# Patient Record
Sex: FEMALE | Race: WHITE | Employment: FULL TIME | URBAN - METROPOLITAN AREA
[De-identification: names, ages, dates, MRNs, and addresses within clinical notes are randomized per-mention and may not be internally consistent; named-entity substitution may affect disease eponyms.]

---

## 2017-01-16 ENCOUNTER — OFFICE VISIT (OUTPATIENT)
Dept: SLEEP MEDICINE | Age: 57
End: 2017-01-16

## 2017-01-16 VITALS
WEIGHT: 293 LBS | SYSTOLIC BLOOD PRESSURE: 144 MMHG | DIASTOLIC BLOOD PRESSURE: 79 MMHG | OXYGEN SATURATION: 94 % | HEART RATE: 67 BPM | HEIGHT: 62 IN | BODY MASS INDEX: 53.92 KG/M2 | TEMPERATURE: 96.9 F

## 2017-01-16 DIAGNOSIS — G47.33 OSA (OBSTRUCTIVE SLEEP APNEA): Primary | ICD-10-CM

## 2017-01-16 DIAGNOSIS — E66.01 OBESITY, MORBID, BMI 50 OR HIGHER (HCC): ICD-10-CM

## 2017-01-16 NOTE — MR AVS SNAPSHOT
Visit Information Date & Time Provider Department Dept. Phone Encounter #  
 1/16/2017  3:00 PM Arben Roman, Kristan Zamarripa [id number] Follow-up Instructions Return if symptoms worsen or fail to improve. Follow-up and Disposition History Upcoming Health Maintenance Date Due Hepatitis C Screening 1960 DTaP/Tdap/Td series (1 - Tdap) 7/8/1979 PAP AKA CERVICAL CYTOLOGY 7/8/1979 FOBT Q 1 YEAR AGE 50-75 7/8/2008 INFLUENZA AGE 9 TO ADULT 8/1/2016 BREAST CANCER SCRN MAMMOGRAM 9/3/2017 Allergies as of 1/16/2017  Review Complete On: 1/16/2017 By: Arben Roman MD  
  
 Severity Noted Reaction Type Reactions Erythromycin  06/23/2011    Hives Current Immunizations  Never Reviewed Name Date Pneumococcal Polysaccharide (PPSV-23) 3/16/2016 10:32 AM  
  
 Not reviewed this visit You Were Diagnosed With   
  
 Codes Comments NANETTE (obstructive sleep apnea)    -  Primary ICD-10-CM: G47.33 
ICD-9-CM: 327.23 Obesity, morbid, BMI 50 or higher (HCC)     ICD-10-CM: E66.01 
ICD-9-CM: 278.01 Vitals BP Pulse Temp Height(growth percentile) Weight(growth percentile) SpO2  
 144/79 67 96.9 °F (36.1 °C) 5' 2\" (1.575 m) 311 lb (141.1 kg) 94% BMI OB Status Smoking Status 56.88 kg/m2 Polycystic Ovarian Syndrome Never Smoker Vitals History BMI and BSA Data Body Mass Index Body Surface Area  
 56.88 kg/m 2 2.48 m 2 [phone] [facility] [place], [address] AT [facility] [phone] Your Updated Medication List  
  
   
This list is accurate as of: 1/16/17  3:02 PM.  Always use your most recent med list.  
  
  
  
  
 albuterol 90 mcg/actuation inhaler Commonly known as:  PROVENTIL HFA, VENTOLIN HFA, PROAIR HFA  
2 puff QID.  AMLODIPINE PO  
 Take 5 mg by mouth daily (with dinner). atorvastatin 20 mg tablet Commonly known as:  LIPITOR Take 20 mg by mouth daily (with dinner). CeleBREX 200 mg capsule Generic drug:  celecoxib Take  by mouth two (2) times a day. doxycycline 100 mg capsule Commonly known as:  Sholaren Hashimoto Take 100 mg by mouth daily. HYDROcodone-acetaminophen  mg tablet Commonly known as:  Harshal Dolphin Take 1 Tab by mouth every six (6) hours as needed. Max Daily Amount: 4 Tabs. ibuprofen 600 mg tablet Commonly known as:  MOTRIN Take 1 Tab by mouth every six (6) hours as needed for Pain. LEVOTHYROXINE PO Take 137 mcg by mouth. LOSARTAN PO Take 50 mg by mouth daily (with dinner). METFORMIN PO Take 2,000 mg by mouth. ondansetron 4 mg disintegrating tablet Commonly known as:  ZOFRAN ODT Take 1 Tab by mouth every eight (8) hours as needed for Nausea. PEPCID 40 mg tablet Generic drug:  famotidine Take 40 mg by mouth daily. spironolactone 100 mg tablet Commonly known as:  ALDACTONE Take 100 mg by mouth daily. We Performed the Following AMB SUPPLY ORDER [[id number] Custom] Comments:  
 Wireless modem enrollment with privileges to change therapy remotely - indefinite. PAP Mask -patient preference, tubing, filters as needed (all sleep supplies) Length of Need = 99 months Tim Hussein M.D.  (electronically signed) Diplomate in Sleep Medicine, ABIM Follow-up Instructions Return if symptoms worsen or fail to improve. Patient Instructions [address] [facility], [place] [address] Tel.  [phone] Fax. 100 [facility] [address] Tel.  [phone] Fax. [phone] [address] [place] [address] Tel.  [phone] Fax. [phone] Learning About CPAP for Sleep Apnea What is CPAP? CPAP is a small machine that you use at home every night while you sleep. It increases air pressure in your throat to keep your airway open. When you have sleep apnea, this can help you sleep better so you feel much better. CPAP stands for \"continuous positive airway pressure. \" The CPAP machine will have one of the following: · A mask that covers your nose and mouth · Prongs that fit into your nose · A mask that covers your nose only, the most common type. This type is called NCPAP. The N stands for \"nasal.\" Why is it done? CPAP is usually the best treatment for obstructive sleep apnea. It is the first treatment choice and the most widely used. Your doctor may suggest CPAP if you have: · Moderate to severe sleep apnea. · Sleep apnea and coronary artery disease (CAD) or heart failure. How does it help? · CPAP can help you have more normal sleep, so you feel less sleepy and more alert during the daytime. · CPAP may help keep heart failure or other heart problems from getting worse. · NCPAP may help lower your blood pressure. · If you use CPAP, your bed partner may also sleep better because you are not snoring or restless. What are the side effects? Some people who use CPAP have: · A dry or stuffy nose and a sore throat. · Irritated skin on the face. · Sore eyes. · Bloating. If you have any of these problems, work with your doctor to fix them. Here are some things you can try: · Be sure the mask or nasal prongs fit well. · See if your doctor can adjust the pressure of your CPAP. · If your nose is dry, try a humidifier. · If your nose is runny or stuffy, try decongestant medicine or a steroid nasal spray. If these things do not help, you might try a different type of machine. Some machines have air pressure that adjusts on its own.  Others have air pressures that are different when you breathe in than when you breathe out. This may reduce discomfort caused by too much pressure in your nose. Where can you learn more? Go to ReCoTech.be Enter F570 in the search box to learn more about \"Learning About CPAP for Sleep Apnea. \"  
© 9321-4829 Healthwise, Incorporated. Care instructions adapted under license by Tahira Eubanks (which disclaims liability or warranty for this information). This care instruction is for use with your licensed healthcare professional. If you have questions about a medical condition or this instruction, always ask your healthcare professional. [address] any warranty or liability for your use of this information. Content Version: 2.0.52262; Last Revised: January 11, 2010 PROPER SLEEP HYGIENE What to avoid · Do not have drinks with caffeine, such as coffee or black tea, for 8 hours before bed. · Do not smoke or use other types of tobacco near bedtime. Nicotine is a stimulant and can keep you awake. · Avoid drinking alcohol late in the evening, because it can cause you to wake in the middle of the night. · Do not eat a big meal close to bedtime. If you are hungry, eat a light snack. · Do not drink a lot of water close to bedtime, because the need to urinate may wake you up during the night. · Do not read or watch TV in bed. Use the bed only for sleeping and sexual activity. What to try · Go to bed at the same time every night, and wake up at the same time every morning. Do not take naps during the day. · Keep your bedroom quiet, dark, and cool. · Get regular exercise, but not within 3 to 4 hours of your bedtime. Jeromy Robison · Sleep on a comfortable pillow and mattress. · If watching the clock makes you anxious, turn it facing away from you so you cannot see the time. · If you worry when you lie down, start a worry book. Well before bedtime, write down your worries, and then set the book and your concerns aside. · Try meditation or other relaxation techniques before you go to bed. · If you cannot fall asleep, get up and go to another room until you feel sleepy. Do something relaxing. Repeat your bedtime routine before you go to bed again. · Make your house quiet and calm about an hour before bedtime. Turn down the lights, turn off the TV, log off the computer, and turn down the volume on music. This can help you relax after a busy day. Drowsy Driving: The [employer] cites drowsiness as a causing factor in more than 990,634 police reported crashes annually, resulting in 76,000 injuries and 1,500 deaths. Other surveys suggest 55% of people polled have driven while drowsy in the past year, 23% had fallen asleep but not crashed, 3% crashed, and 2% had and accident due to drowsy driving. Who is at risk? Young Drivers: One study of drowsy driving accidents states that 55% of the drivers were under 25 years. Of those, 75% were male. Shift Workers and Travelers: People who work overnight or travel across time zones frequently are at higher risk of experiencing Circadian Rhythm Disorders. They are trying to work and function when their body is programed to sleep. Sleep Deprived: Lack of sleep has a serious impact on your ability to pay attention or focus on a task. Consistently getting less than the average of 8 hours your body needs creates partial or cumulative sleep deprivation. Untreated Sleep Disorders: Sleep Apnea, Narcolepsy, R.L.S., and other sleep disorders (untreated) prevent a person from getting enough restful sleep. This leads to excessive daytime sleepiness and increases the risk for drowsy driving accidents by up to 7 times. Medications / Alcohol: Even over the counter medications can cause drowsiness. Medications that impair a drivers attention should have a warning label.  Alcohol naturally makes you sleepy and on its own can cause accidents. Combined with excessive drowsiness its effects are amplified. Signs of Drowsy Driving: * You don't remember driving the last few miles * You may drift out of your henry * You are unable to focus and your thoughts wander * You may yawn more often than normal 
 * You have difficulty keeping your eyes open / nodding off * Missing traffic signs, speeding, or tailgating Prevention-  
Good sleep hygiene, lifestyle and behavioral choices have the most impact on drowsy driving. There is no substitute for sleep and the average person requires 8 hours nightly. If you find yourself driving drowsy, stop and sleep. Consider the sleep hygiene tips provided during your visit as well. Medication Refill Policy: Refills for all medications require 1 week advance notice. Please have your pharmacy fax a refill request. We are unable to fax, or call in \"controled substance\" medications and you will need to pick these prescriptions up from our office. Thinktwice Activation Thank you for requesting access to Thinktwice. Please follow the instructions below to securely access and download your online medical record. Thinktwice allows you to send messages to your doctor, view your test results, renew your prescriptions, schedule appointments, and more. How Do I Sign Up? 1. In your internet browser, go to [email or web address]. 2. Click on the First Time User? Click Here link in the Sign In box. You will see the New Member Sign Up page. 3. Enter your Thinktwice Access Code exactly as it appears below. You will not need to use this code after youve completed the sign-up process. If you do not sign up before the expiration date, you must request a new code. Thinktwice Access Code: [email or web address] Expires: 2017  2:49 PM (This is the date your Thinktwice access code will ) 4.  Enter the last four digits of your Social Security Number (xxxx) and Date of Birth (mm/dd/yyyy) as indicated and click Submit. You will be taken to the next sign-up page. 5. Create a Mobile2Me ID. This will be your Mobile2Me login ID and cannot be changed, so think of one that is secure and easy to remember. 6. Create a Mobile2Me password. You can change your password at any time. 7. Enter your Password Reset Question and Answer. This can be used at a later time if you forget your password. 8. Enter your e-mail address. You will receive e-mail notification when new information is available in [address] 9. Click Sign Up. You can now view and download portions of your medical record. 10. Click the Download Summary menu link to download a portable copy of your medical information. Additional Information If you have questions, please call [phone]. Remember, Mobile2Me is NOT to be used for urgent needs. For medical emergencies, dial 911. Starting a Weight Loss Plan: After Your Visit Your Care Instructions If you are thinking about losing weight, it can be hard to know where to start. Your doctor can help you set up a weight loss plan that best meets your needs. You may want to take a class on nutrition or exercise, or join a weight loss support group. If you have questions about how to make changes to your eating or exercise habits, ask your doctor about seeing a registered dietitian or an exercise specialist. 
It can be a big challenge to lose weight. But you do not have to make huge changes at once. Make small changes, and stick with them. When those changes become habit, add a few more changes. If you do not think you are ready to make changes right now, try to pick a date in the future. Make an appointment to see your doctor to discuss whether the time is right for you to start a plan. Follow-up care is a key part of your treatment and safety.  Be sure to make and go to all appointments, and call your doctor if you are having problems. It's also a good idea to know your test results and keep a list of the medicines you take. How can you care for yourself at home? · Set realistic goals. Many people expect to lose much more weight than is likely. A weight loss of 5% to 10% of your body weight may be enough to improve your health. · Get family and friends involved to provide support. Talk to them about why you are trying to lose weight, and ask them to help. They can help by participating in exercise and having meals with you, even if they may be eating something different. · Find what works best for you. If you do not have time or do not like to cook, a program that offers meal replacement bars or shakes may be better for you. Or if you like to prepare meals, finding a plan that includes daily menus and recipes may be best. 
· Ask your doctor about other health professionals who can help you achieve your weight loss goals. ¨ A dietitian can help you make healthy changes in your diet. ¨ An exercise specialist or  can help you develop a safe and effective exercise program. 
¨ A counselor or psychiatrist can help you cope with issues such as depression, anxiety, or family problems that can make it hard to focus on weight loss. · Consider joining a support group for people who are trying to lose weight. Your doctor can suggest groups in your area. Where can you learn more? Go to [employer].be Enter K519 in the search box to learn more about \"Starting a Weight Loss Plan: After Your Visit. \"  
© 5719-5481 Healthwise, Incorporated. Care instructions adapted under license by Nieves Delgadillo (which disclaims liability or warranty for this information).  This care instruction is for use with your licensed healthcare professional. If you have questions about a medical condition or this instruction, always ask your healthcare professional. [employer] disclaims any warranty or liability for your use of this information. Content Version: 3.3.43441; Last Revised: September 1, 2009 Introducing [facility]! Johnathan Shipman introduces [employer] patient portal. Now you can access parts of your medical record, email your doctor's office, and request medication refills online. 1. In your internet browser, go to [email or web address][email or web address] 2. Click on the First Time User? Click Here link in the Sign In box. You will see the New Member Sign Up page. 3. Enter your [employer] Access Code exactly as it appears below. You will not need to use this code after youve completed the sign-up process. If you do not sign up before the expiration date, you must request a new code. · [employer] Access Code: [email or web address] Expires: 4/16/2017  2:49 PM 
 
4. Enter the last four digits of your Social Security Number (xxxx) and Date of Birth (mm/dd/yyyy) as indicated and click Submit. You will be taken to the next sign-up page. 5. Create a [employer] ID. This will be your [employer] login ID and cannot be changed, so think of one that is secure and easy to remember. 6. Create a [employer] password. You can change your password at any time. 7. Enter your Password Reset Question and Answer. This can be used at a later time if you forget your password. 8. Enter your e-mail address. You will receive e-mail notification when new information is available in [address] 9. Click Sign Up. You can now view and download portions of your medical record. 10. Click the Download Summary menu link to download a portable copy of your medical information. If you have questions, please visit the Frequently Asked Questions section of the [employer] website. Remember, [employer] is NOT to be used for urgent needs. For medical emergencies, dial 911. Now available from your iPhone and Android! Please provide this summary of care documentation to your next provider. Your primary care clinician is listed as Na Lazo. If you have any questions after today's visit, please call [phone].

## 2017-01-16 NOTE — PATIENT INSTRUCTIONS
[address] [facility] [place]. [place], [address]  Tel.  [phone]  Fax. 100 [facility] 60  [place], [address] [facility]  Tel.  [phone]  Fax. [phone] [address]  Tel.  [phone]  Fax. [phone]     Learning About CPAP for Sleep Apnea  What is CPAP? CPAP is a small machine that you use at home every night while you sleep. It increases air pressure in your throat to keep your airway open. When you have sleep apnea, this can help you sleep better so you feel much better. CPAP stands for \"continuous positive airway pressure. \"  The CPAP machine will have one of the following:  · A mask that covers your nose and mouth  · Prongs that fit into your nose  · A mask that covers your nose only, the most common type. This type is called NCPAP. The N stands for \"nasal.\"  Why is it done? CPAP is usually the best treatment for obstructive sleep apnea. It is the first treatment choice and the most widely used. Your doctor may suggest CPAP if you have:  · Moderate to severe sleep apnea. · Sleep apnea and coronary artery disease (CAD) or heart failure. How does it help? · CPAP can help you have more normal sleep, so you feel less sleepy and more alert during the daytime. · CPAP may help keep heart failure or other heart problems from getting worse. · NCPAP may help lower your blood pressure. · If you use CPAP, your bed partner may also sleep better because you are not snoring or restless. What are the side effects? Some people who use CPAP have:  · A dry or stuffy nose and a sore throat. · Irritated skin on the face. · Sore eyes. · Bloating. If you have any of these problems, work with your doctor to fix them. Here are some things you can try:  · Be sure the mask or nasal prongs fit well. · See if your doctor can adjust the pressure of your CPAP. · If your nose is dry, try a humidifier.   · If your nose is runny or stuffy, try decongestant medicine or a steroid nasal spray. If these things do not help, you might try a different type of machine. Some machines have air pressure that adjusts on its own. Others have air pressures that are different when you breathe in than when you breathe out. This may reduce discomfort caused by too much pressure in your nose. Where can you learn more? Go to OpenVPN.be  Enter Maryan Santos in the search box to learn more about \"Learning About CPAP for Sleep Apnea. \"   © 2659-5877 Healthwise, Incorporated. Care instructions adapted under license by Wilbur Joyce (which disclaims liability or warranty for this information). This care instruction is for use with your licensed healthcare professional. If you have questions about a medical condition or this instruction, always ask your healthcare professional. [address] any warranty or liability for your use of this information. Content Version: 1.9.16648; Last Revised: January 11, 2010  PROPER SLEEP HYGIENE    What to avoid  · Do not have drinks with caffeine, such as coffee or black tea, for 8 hours before bed. · Do not smoke or use other types of tobacco near bedtime. Nicotine is a stimulant and can keep you awake. · Avoid drinking alcohol late in the evening, because it can cause you to wake in the middle of the night. · Do not eat a big meal close to bedtime. If you are hungry, eat a light snack. · Do not drink a lot of water close to bedtime, because the need to urinate may wake you up during the night. · Do not read or watch TV in bed. Use the bed only for sleeping and sexual activity. What to try  · Go to bed at the same time every night, and wake up at the same time every morning. Do not take naps during the day. · Keep your bedroom quiet, dark, and cool. · Get regular exercise, but not within 3 to 4 hours of your bedtime. .  · Sleep on a comfortable pillow and mattress.   · If watching the clock makes you anxious, turn it facing away from you so you cannot see the time. · If you worry when you lie down, start a worry book. Well before bedtime, write down your worries, and then set the book and your concerns aside. · Try meditation or other relaxation techniques before you go to bed. · If you cannot fall asleep, get up and go to another room until you feel sleepy. Do something relaxing. Repeat your bedtime routine before you go to bed again. · Make your house quiet and calm about an hour before bedtime. Turn down the lights, turn off the TV, log off the computer, and turn down the volume on music. This can help you relax after a busy day. Drowsy Driving: The [employer] cites drowsiness as a causing factor in more than 374,508 police reported crashes annually, resulting in 76,000 injuries and 1,500 deaths. Other surveys suggest 55% of people polled have driven while drowsy in the past year, 23% had fallen asleep but not crashed, 3% crashed, and 2% had and accident due to drowsy driving. Who is at risk? Young Drivers: One study of drowsy driving accidents states that 55% of the drivers were under 25 years. Of those, 75% were male. Shift Workers and Travelers: People who work overnight or travel across time zones frequently are at higher risk of experiencing Circadian Rhythm Disorders. They are trying to work and function when their body is programed to sleep. Sleep Deprived: Lack of sleep has a serious impact on your ability to pay attention or focus on a task. Consistently getting less than the average of 8 hours your body needs creates partial or cumulative sleep deprivation. Untreated Sleep Disorders: Sleep Apnea, Narcolepsy, R.L.S., and other sleep disorders (untreated) prevent a person from getting enough restful sleep. This leads to excessive daytime sleepiness and increases the risk for drowsy driving accidents by up to 7 times.   Medications / Alcohol: Even over the counter medications can cause drowsiness. Medications that impair a drivers attention should have a warning label. Alcohol naturally makes you sleepy and on its own can cause accidents. Combined with excessive drowsiness its effects are amplified. Signs of Drowsy Driving:   * You don't remember driving the last few miles   * You may drift out of your henry   * You are unable to focus and your thoughts wander   * You may yawn more often than normal   * You have difficulty keeping your eyes open / nodding off   * Missing traffic signs, speeding, or tailgating  Prevention-   Good sleep hygiene, lifestyle and behavioral choices have the most impact on drowsy driving. There is no substitute for sleep and the average person requires 8 hours nightly. If you find yourself driving drowsy, stop and sleep. Consider the sleep hygiene tips provided during your visit as well. Medication Refill Policy: Refills for all medications require 1 week advance notice. Please have your pharmacy fax a refill request. We are unable to fax, or call in \"controled substance\" medications and you will need to pick these prescriptions up from our office. AQUA PURE Activation    Thank you for requesting access to AQUA PURE. Please follow the instructions below to securely access and download your online medical record. AQUA PURE allows you to send messages to your doctor, view your test results, renew your prescriptions, schedule appointments, and more. How Do I Sign Up? 1. In your internet browser, go to [email or web address]. 2. Click on the First Time User? Click Here link in the Sign In box. You will see the New Member Sign Up page. 3. Enter your AQUA PURE Access Code exactly as it appears below. You will not need to use this code after youve completed the sign-up process. If you do not sign up before the expiration date, you must request a new code.     AQUA PURE Access Code: Samuel Ovalle  Expires: 2017  2:49 PM (This is the date your [email or web address] access code will )    4. Enter the last four digits of your Social Security Number (xxxx) and Date of Birth (mm/dd/yyyy) as indicated and click Submit. You will be taken to the next sign-up page. 5. Create a [email or web address] ID. This will be your [email or web address] login ID and cannot be changed, so think of one that is secure and easy to remember. 6. Create a [email or web address] password. You can change your password at any time. 7. Enter your Password Reset Question and Answer. This can be used at a later time if you forget your password. 8. Enter your e-mail address. You will receive e-mail notification when new information is available in [address] 9. Click Sign Up. You can now view and download portions of your medical record. 10. Click the Download Summary menu link to download a portable copy of your medical information. Additional Information    If you have questions, please call [phone]. Remember, [email or web address] is NOT to be used for urgent needs. For medical emergencies, dial 911. Starting a Weight Loss Plan: After Your Visit  Your Care Instructions  If you are thinking about losing weight, it can be hard to know where to start. Your doctor can help you set up a weight loss plan that best meets your needs. You may want to take a class on nutrition or exercise, or join a weight loss support group. If you have questions about how to make changes to your eating or exercise habits, ask your doctor about seeing a registered dietitian or an exercise specialist.  It can be a big challenge to lose weight. But you do not have to make huge changes at once. Make small changes, and stick with them. When those changes become habit, add a few more changes. If you do not think you are ready to make changes right now, try to pick a date in the future.  Make an appointment to see your doctor to discuss whether the time is right for you to start a plan.  Follow-up care is a key part of your treatment and safety. Be sure to make and go to all appointments, and call your doctor if you are having problems. It's also a good idea to know your test results and keep a list of the medicines you take. How can you care for yourself at home? · Set realistic goals. Many people expect to lose much more weight than is likely. A weight loss of 5% to 10% of your body weight may be enough to improve your health. · Get family and friends involved to provide support. Talk to them about why you are trying to lose weight, and ask them to help. They can help by participating in exercise and having meals with you, even if they may be eating something different. · Find what works best for you. If you do not have time or do not like to cook, a program that offers meal replacement bars or shakes may be better for you. Or if you like to prepare meals, finding a plan that includes daily menus and recipes may be best.  · Ask your doctor about other health professionals who can help you achieve your weight loss goals. ¨ A dietitian can help you make healthy changes in your diet. ¨ An exercise specialist or  can help you develop a safe and effective exercise program.  ¨ A counselor or psychiatrist can help you cope with issues such as depression, anxiety, or family problems that can make it hard to focus on weight loss. · Consider joining a support group for people who are trying to lose weight. Your doctor can suggest groups in your area. Where can you learn more? Go to ContentForest.be  Enter U357 in the search box to learn more about \"Starting a Weight Loss Plan: After Your Visit. \"   © 6059-8023 Healthwise, Incorporated. Care instructions adapted under license by [employer] (which disclaims liability or warranty for this information).  This care instruction is for use with your licensed healthcare professional. If you have questions about a medical condition or this instruction, always ask your healthcare professional. [place] [address] any warranty or liability for your use of this information.   Content Version: 7.6.22022; Last Revised: September 1, 2009

## 2017-01-16 NOTE — PROGRESS NOTES
201 [facility]., [place]. [place], [address]  Tel.  [phone]  Fax. 100 [facility] 60  [place], [address] [facility]  Tel.  [phone]  Fax. [phone] [address]   Tel.  [phone]  Fax. [phone]     S>Renu Albert is a 60 y.o. female seen for a positive airway pressure follow-up. She reports no problems using the device. The following problems are identified:    Drowsiness no Problems exhaling no   Snoring no Forget to put on no   Mask Comfortable yes Can't fall asleep no   Dry Mouth no Mask falls off no   Air Leaking no Frequent awakenings no         She admits that her sleep has improved. Allergies   Allergen Reactions    Erythromycin Hives       She has a current medication list which includes the following prescription(s): ibuprofen, atorvastatin, famotidine, spironolactone, doxycycline, amlodipine besylate, losartan potassium, levothyroxine sodium, hydrocodone-acetaminophen, ondansetron, celecoxib, albuterol, and metformin hcl. .      She  has a past medical history of Arthritis; Chronic pain; GERD (gastroesophageal reflux disease); Hypertension; Hypothyroid; Polycystic disease, ovaries; and Sleep apnea. [place] Sleepiness Score: 1   and Modified F.O.S.Q. Score Total / 2: 18   which reflect improved sleep quality over therapy time. O>    Visit Vitals    /79    Pulse 67    Temp 96.9 °F (36.1 °C)    Ht 5' 2\" (1.575 m)    Wt 311 lb (141.1 kg)    SpO2 94%    BMI 56.88 kg/m2           General:   Alert, oriented, not in distress   Neck:   No JVD    Chest/Lungs:  symetrical lung expansion , no accessory muscle use    Extremities:  no obvious rashes , negative edema    Neuro:  No focal deficits ; No obvious tremor    Psych:  Normal affect ,  Normal countenance ;           A>    ICD-10-CM ICD-9-CM    1. NANETTE (obstructive sleep apnea) G47.33 327.23 AMB SUPPLY ORDER   2.  Obesity, morbid, BMI 50 or higher ([facility]) E66.01 278.01      AHI = 77 (2015). On CPAP :  10-14 cmH2O. Compliant:      yes    Therapeutic Response:  Positive    P>      * Follow-up Disposition:  Return if symptoms worsen or fail to improve. * She was asked to contact our office for any problems regarding PAP therapy. * Counseling was provided regarding the importance of regular PAP use and on proper sleep hygiene and safe driving. * Re-enforced proper and regular cleaning for the device. Discussed the patient's above normal BMI with her. I have recommended the following interventions: dietary management education, guidance, and counseling . The BMI follow up plan is as follows: BMI is out of normal parameters and plan is as follows: I have counseled this patient on diet and exercise regimens    Thank you for allowing us to participate in your patient's medical care. Larry Blancas M.D.   Diplomate in Sleep Medicine, [facility]

## 2017-01-17 ENCOUNTER — DOCUMENTATION ONLY (OUTPATIENT)
Dept: SLEEP MEDICINE | Age: 57
End: 2017-01-17

## 2017-07-27 ENCOUNTER — HOSPITAL ENCOUNTER (OUTPATIENT)
Dept: MAMMOGRAPHY | Age: 57
Discharge: HOME | End: 2017-07-27
Attending: SPECIALIST
Payer: COMMERCIAL

## 2017-07-27 DIAGNOSIS — Z12.31 VISIT FOR SCREENING MAMMOGRAM: ICD-10-CM

## 2017-07-27 PROCEDURE — 77067 SCR MAMMO BI INCL CAD: CPT

## 2018-02-24 ENCOUNTER — OFFICE VISIT (OUTPATIENT)
Dept: PRIMARY CARE CLINIC | Age: 58
End: 2018-02-24

## 2018-02-24 VITALS
OXYGEN SATURATION: 95 % | WEIGHT: 293 LBS | TEMPERATURE: 98 F | RESPIRATION RATE: 22 BRPM | HEIGHT: 62 IN | BODY MASS INDEX: 53.92 KG/M2 | SYSTOLIC BLOOD PRESSURE: 133 MMHG | HEART RATE: 59 BPM | DIASTOLIC BLOOD PRESSURE: 69 MMHG

## 2018-02-24 DIAGNOSIS — J01.00 ACUTE MAXILLARY SINUSITIS, RECURRENCE NOT SPECIFIED: Primary | ICD-10-CM

## 2018-02-24 RX ORDER — AMOXICILLIN AND CLAVULANATE POTASSIUM 875; 125 MG/1; MG/1
1 TABLET, FILM COATED ORAL EVERY 12 HOURS
Qty: 20 TAB | Refills: 0 | Status: SHIPPED | OUTPATIENT
Start: 2018-02-24 | End: 2018-03-06

## 2018-02-24 NOTE — PATIENT INSTRUCTIONS
Sinusitis: Care Instructions  Your Care Instructions    Sinusitis is an infection of the lining of the sinus cavities in your head. Sinusitis often follows a cold. It causes pain and pressure in your head and face. In most cases, sinusitis gets better on its own in 1 to 2 weeks. But some mild symptoms may last for several weeks. Sometimes antibiotics are needed. Follow-up care is a key part of your treatment and safety. Be sure to make and go to all appointments, and call your doctor if you are having problems. It's also a good idea to know your test results and keep a list of the medicines you take. How can you care for yourself at home? · Take an over-the-counter pain medicine, such as acetaminophen (Tylenol), ibuprofen (Advil, Motrin), or naproxen (Aleve). Read and follow all instructions on the label. · If the doctor prescribed antibiotics, take them as directed. Do not stop taking them just because you feel better. You need to take the full course of antibiotics. · Be careful when taking over-the-counter cold or flu medicines and Tylenol at the same time. Many of these medicines have acetaminophen, which is Tylenol. Read the labels to make sure that you are not taking more than the recommended dose. Too much acetaminophen (Tylenol) can be harmful. · Breathe warm, moist air from a steamy shower, a hot bath, or a sink filled with hot water. Avoid cold, dry air. Using a humidifier in your home may help. Follow the directions for cleaning the machine. · Use saline (saltwater) nasal washes to help keep your nasal passages open and wash out mucus and bacteria. You can buy saline nose drops at a grocery store or drugstore. Or you can make your own at home by adding 1 teaspoon of salt and 1 teaspoon of baking soda to 2 cups of distilled water. If you make your own, fill a bulb syringe with the solution, insert the tip into your nostril, and squeeze gently. Chantelle Rivera your nose.   · Put a hot, wet towel or a warm gel pack on your face 3 or 4 times a day for 5 to 10 minutes each time. · Try a decongestant nasal spray like oxymetazoline (Afrin). Do not use it for more than 3 days in a row. Using it for more than 3 days can make your congestion worse. When should you call for help? Call your doctor now or seek immediate medical care if:  ? · You have new or worse swelling or redness in your face or around your eyes. ? · You have a new or higher fever. ? Watch closely for changes in your health, and be sure to contact your doctor if:  ? · You have new or worse facial pain. ? · The mucus from your nose becomes thicker (like pus) or has new blood in it. ? · You are not getting better as expected. Where can you learn more? Go to http://carey-robert.info/. Enter W743 in the search box to learn more about \"Sinusitis: Care Instructions. \"  Current as of: May 12, 2017  Content Version: 11.4  © 8298-9635 Healthwise, Incorporated. Care instructions adapted under license by [employer] (which disclaims liability or warranty for this information). If you have questions about a medical condition or this instruction, always ask your healthcare professional. [address] any warranty or liability for your use of this information.

## 2018-02-24 NOTE — PROGRESS NOTES
Subjective:   Kaylie Waller is a 59 y.o. female who complains of congestion, sore throat, nasal blockage and dry cough for 7 days, gradually worsening since that time. She denies a history of shortness of breath and wheezing. Evaluation to date: none. Treatment to date: OTC products. Patient does not smoke cigarettes. Relevant PMH: No pertinent additional PMH. Patient Active Problem List   Diagnosis Code    NANETTE (obstructive sleep apnea) G47.33     Patient Active Problem List    Diagnosis Date Noted    NANETTE (obstructive sleep apnea) 05/11/2015     Current Outpatient Prescriptions   Medication Sig Dispense Refill    SERTRALINE HCL (SERTRALINE PO) Take  by mouth. Indications: per pt-25mg or 50mg      ibuprofen (MOTRIN) 600 mg tablet Take 1 Tab by mouth every six (6) hours as needed for Pain. 30 Tab 0    atorvastatin (LIPITOR) 20 mg tablet Take 20 mg by mouth daily (with dinner).  famotidine (PEPCID) 40 mg tablet Take 40 mg by mouth daily.  spironolactone (ALDACTONE) 100 mg tablet Take 100 mg by mouth daily.  doxycycline (MONODOX) 100 mg capsule Take 100 mg by mouth daily as needed.  LOSARTAN PO Take 100 mg by mouth daily (with dinner).  LEVOTHYROXINE SODIUM (LEVOTHYROXINE PO) Take 137 mcg by mouth.        Allergies   Allergen Reactions    Erythromycin Hives     Past Medical History:   Diagnosis Date    Arthritis     OSTEOARTHRITIS IN KNEES AND RT ANKLE    Chronic pain     ARTHRITIS    GERD (gastroesophageal reflux disease)     Hypertension     Hypothyroid     Polycystic disease, ovaries     Sleep apnea     USES CPAP     Past Surgical History:   Procedure Laterality Date    FEMUR/KNEE SURG UNLISTED      right femur fx    HX BREAST BIOPSY Left     yrs ago    HX DILATION AND CURETTAGE  2/2016    HX ORTHOPAEDIC      carpal tunnel repair bilateral    HX TONSILLECTOMY       Family History   Problem Relation Age of Onset    Glaucoma Mother     Thyroid Disease Mother  Diabetes Mother      WITH NEUROPATHY    Hypertension Mother     Arrhythmia Mother      AFIB    Other Father      BPH    Muscular dystrophy Sister     Anesth Problems Neg Hx      Social History   Substance Use Topics    Smoking status: Never Smoker    Smokeless tobacco: Never Used    Alcohol use No        Review of Systems  Pertinent items are noted in HPI. Objective:     Visit Vitals    /69 (BP 1 Location: Left arm, BP Patient Position: Sitting)    Pulse (!) 59    Temp 98 °F (36.7 °C) (Oral)    Resp 22    Ht 5' 2\" (1.575 m)    Wt 309 lb (140.2 kg)    SpO2 95%    BMI 56.52 kg/m2     General:  alert, cooperative, no distress   Eyes: negative   Ears: abnormal TM AD - erythematous, bulging, abnormal TM AS - erythematous, bulging   Sinuses: tenderness over bilateral maxillary, frontal and ethmoid   Mouth:  Lips, mucosa, and tongue normal. Teeth and gums normal and abnormal findings: moderate oropharyngeal erythema   Neck: supple, symmetrical, trachea midline and no adenopathy. Heart: S1 and S2 normal, no murmurs noted. Lungs: clear to auscultation bilaterally   Abdomen: soft, non-tender. Bowel sounds normal. No masses,  no organomegaly        Assessment/Plan:   sinusitis  Discussed the dx and tx of sinusitis. Suggested symptomatic OTC remedies. Antibiotics per orders. RTC prn. ICD-10-CM ICD-9-CM    1. Acute maxillary sinusitis, recurrence not specified J01.00 461.0    .

## 2018-02-24 NOTE — MR AVS SNAPSHOT
[address] 
[phone] Patient: Aislinn Ortiz MRN: [id number] Visit Information Date & Time Provider Department Dept. Phone Encounter #  
 2/24/2018  9:00 AM Maty Broderick 74 [id number] Follow-up Instructions Return if symptoms worsen or fail to improve. Your Appointments 4/26/2018  3:00 PM  
Any with Benoit Giles MD  
[address] [facility] ([facility] Appt Note: 1 year f/up- bring machine [address] [facility], [address] [address] [address] [facility], [address] [phone] Upcoming Health Maintenance Date Due Hepatitis C Screening 1960 DTaP/Tdap/Td series (1 - Tdap) 7/8/1979 PAP AKA CERVICAL CYTOLOGY 7/8/1979 FOBT Q 1 YEAR AGE 50-75 7/8/2008 Influenza Age 5 to Adult 8/1/2017 BREAST CANCER SCRN MAMMOGRAM 7/27/2019 Allergies as of 2/24/2018  Review Complete On: 2/24/2018 By: Radha Sanchez LPN Severity Noted Reaction Type Reactions Erythromycin  06/23/2011    Hives Current Immunizations  Never Reviewed Name Date Pneumococcal Polysaccharide (PPSV-23) 3/16/2016 10:32 AM  
  
 Not reviewed this visit You Were Diagnosed With   
  
 Codes Comments Acute maxillary sinusitis, recurrence not specified    -  Primary ICD-10-CM: J01.00 ICD-9-CM: 461.0 Vitals BP Pulse Temp Resp Height(growth percentile) Weight(growth percentile) 133/69 (BP 1 Location: Left arm, BP Patient Position: Sitting) (!) 59 98 °F (36.7 °C) (Oral) 22 5' 2\" (1.575 m) 309 lb (140.2 kg) SpO2 BMI OB Status Smoking Status 95% 56.52 kg/m2 Polycystic Ovarian Syndrome Never Smoker Vitals History BMI and BSA Data  Body Mass Index Body Surface Area  
 56.52 kg/m 2 2.48 m 2  
  
  
 Preferred Pharmacy Pharmacy Name Phone [facility]/PHARMACY #[id number] [facility] [phone] Your Updated Medication List  
  
   
This list is accurate as of 2/24/18  9:28 AM.  Always use your most recent med list.  
  
  
  
  
 amoxicillin-clavulanate 875-125 mg per tablet Commonly known as:  AUGMENTIN Take 1 Tab by mouth every twelve (12) hours for 10 days. atorvastatin 20 mg tablet Commonly known as:  LIPITOR Take 20 mg by mouth daily (with dinner). doxycycline 100 mg capsule Commonly known as:  Cielo Nap Take 100 mg by mouth daily as needed. ibuprofen 600 mg tablet Commonly known as:  MOTRIN Take 1 Tab by mouth every six (6) hours as needed for Pain. LEVOTHYROXINE PO Take 137 mcg by mouth. LOSARTAN PO Take 100 mg by mouth daily (with dinner). PEPCID 40 mg tablet Generic drug:  famotidine Take 40 mg by mouth daily. SERTRALINE PO Take  by mouth. Indications: per pt-25mg or 50mg  
  
 spironolactone 100 mg tablet Commonly known as:  ALDACTONE Take 100 mg by mouth daily. Prescriptions Sent to Pharmacy Refills  
 amoxicillin-clavulanate (AUGMENTIN) 875-125 mg per tablet 0 Sig: Take 1 Tab by mouth every twelve (12) hours for 10 days. Class: Normal  
 Pharmacy: [address] #: [phone] Route: Oral  
  
Follow-up Instructions Return if symptoms worsen or fail to improve. Patient Instructions Sinusitis: Care Instructions Your Care Instructions Sinusitis is an infection of the lining of the sinus cavities in your head. Sinusitis often follows a cold. It causes pain and pressure in your head and face. In most cases, sinusitis gets better on its own in 1 to 2 weeks. But some mild symptoms may last for several weeks.  Sometimes antibiotics are needed. Follow-up care is a key part of your treatment and safety. Be sure to make and go to all appointments, and call your doctor if you are having problems. It's also a good idea to know your test results and keep a list of the medicines you take. How can you care for yourself at home? · Take an over-the-counter pain medicine, such as acetaminophen (Tylenol), ibuprofen (Advil, Motrin), or naproxen (Aleve). Read and follow all instructions on the label. · If the doctor prescribed antibiotics, take them as directed. Do not stop taking them just because you feel better. You need to take the full course of antibiotics. · Be careful when taking over-the-counter cold or flu medicines and Tylenol at the same time. Many of these medicines have acetaminophen, which is Tylenol. Read the labels to make sure that you are not taking more than the recommended dose. Too much acetaminophen (Tylenol) can be harmful. · Breathe warm, moist air from a steamy shower, a hot bath, or a sink filled with hot water. Avoid cold, dry air. Using a humidifier in your home may help. Follow the directions for cleaning the machine. · Use saline (saltwater) nasal washes to help keep your nasal passages open and wash out mucus and bacteria. You can buy saline nose drops at a grocery store or drugstore. Or you can make your own at home by adding 1 teaspoon of salt and 1 teaspoon of baking soda to 2 cups of distilled water. If you make your own, fill a bulb syringe with the solution, insert the tip into your nostril, and squeeze gently. Karey Cedeño your nose. · Put a hot, wet towel or a warm gel pack on your face 3 or 4 times a day for 5 to 10 minutes each time. · Try a decongestant nasal spray like oxymetazoline (Afrin). Do not use it for more than 3 days in a row. Using it for more than 3 days can make your congestion worse. When should you call for help? Call your doctor now or seek immediate medical care if: ? · You have new or worse swelling or redness in your face or around your eyes. ? · You have a new or higher fever. ? Watch closely for changes in your health, and be sure to contact your doctor if: 
? · You have new or worse facial pain. ? · The mucus from your nose becomes thicker (like pus) or has new blood in it. ? · You are not getting better as expected. Where can you learn more? Go to http://carey-robert.info/. Enter R942 in the search box to learn more about \"Sinusitis: Care Instructions. \" Current as of: May 12, 2017 Content Version: 11.4 © 2463-1692 Ev[employer]. Care instructions adapted under license by [employer] (which disclaims liability or warranty for this information). If you have questions about a medical condition or this instruction, always ask your healthcare professional. [place] [address] any warranty or liability for your use of this information. Introducing [facility]! Indira Armstrong introduces [employer] patient portal. Now you can access parts of your medical record, email your doctor's office, and request medication refills online. 1. In your internet browser, go to [email or web address] 2. Click on the First Time User? Click Here link in the Sign In box. You will see the New Member Sign Up page. 3. Enter your [employer] Access Code exactly as it appears below. You will not need to use this code after youve completed the sign-up process. If you do not sign up before the expiration date, you must request a new code. · [employer] Access Code: [email or web address] Expires: 5/25/2018  9:03 AM 
 
4. Enter the last four digits of your Social Security Number (xxxx) and Date of Birth (mm/dd/yyyy) as indicated and click Submit. You will be taken to the next sign-up page. 5. Create a PresenceLearningt ID.  This will be your [employer] login ID and cannot be changed, so think of one that is secure and easy to remember. 6. Create a [employer] password. You can change your password at any time. 7. Enter your Password Reset Question and Answer. This can be used at a later time if you forget your password. 8. Enter your e-mail address. You will receive e-mail notification when new information is available in [address] 9. Click Sign Up. You can now view and download portions of your medical record. 10. Click the Download Summary menu link to download a portable copy of your medical information. If you have questions, please visit the Frequently Asked Questions section of the [employer] website. Remember, [employer] is NOT to be used for urgent needs. For medical emergencies, dial 911. Now available from your iPhone and Android! Please provide this summary of care documentation to your next provider. Your primary care clinician is listed as Taylor Holland. If you have any questions after today's visit, please call [phone].

## 2018-10-01 ENCOUNTER — DOCUMENTATION ONLY (OUTPATIENT)
Dept: SLEEP MEDICINE | Age: 58
End: 2018-10-01

## 2018-10-01 ENCOUNTER — OFFICE VISIT (OUTPATIENT)
Dept: SLEEP MEDICINE | Age: 58
End: 2018-10-01

## 2018-10-01 VITALS
HEART RATE: 77 BPM | SYSTOLIC BLOOD PRESSURE: 137 MMHG | DIASTOLIC BLOOD PRESSURE: 73 MMHG | WEIGHT: 293 LBS | BODY MASS INDEX: 53.92 KG/M2 | OXYGEN SATURATION: 94 % | HEIGHT: 62 IN

## 2018-10-01 DIAGNOSIS — G47.33 OSA (OBSTRUCTIVE SLEEP APNEA): Primary | ICD-10-CM

## 2018-10-01 DIAGNOSIS — E66.01 OBESITY, MORBID, BMI 50 OR HIGHER (HCC): ICD-10-CM

## 2018-10-01 NOTE — PROGRESS NOTES
Per Dr. Juni Kulkarni  Set Mode to AutoSet   Set Min Pressure to 10.0 cmH2O   Set Max Pressure to 13.0 cmH2O   Set EPR type to Ramp Only   Set EPR level to 2   Set Ramp enable to Off   Set Ramp time to 5 min   Set Start pressure to 5.0 cmH2O    Previous Settings   Set EPR level to 2   Set Mode to AutoSet   Set Min Pressure to 10.0 cmH2O   Set Max Pressure to 14.0 cmH2O   Set EPR type to Ramp Only   Set Ramp enable to Off   Set Ramp time to 5 min   Set Start pressure to 5.0 cmH2O

## 2018-10-01 NOTE — PROGRESS NOTES
[address] [facility]. [place], [address]  Tel.  [phone]  Fax. 100 [facility] 60  [place], [address] [facility]  Tel.  [phone]  Fax. [phone] [address]   Tel.  [phone]  Fax. [phone]     S>Renu Landry is a 59 y.o. female seen for a positive airway pressure follow-up. She reports no problems using the device. The following problems are identified:    Drowsiness no Problems exhaling Feels like there is a lot of pressure in her ears and head in the morning   Snoring no Forget to put on no   Mask Comfortable yes Can't fall asleep no   Dry Mouth no Mask falls off no   Air Leaking yes Frequent awakenings no     Download reviewed. She admits that her sleep has improved. Therapy Apnea Index averaged over PAP use: 1 /hr which reflects significantly improved sleep breathing condition. Allergies   Allergen Reactions    Erythromycin Hives       She has a current medication list which includes the following prescription(s): sertraline hcl, atorvastatin, spironolactone, doxycycline, losartan potassium, ibuprofen, famotidine, and levothyroxine sodium. .      She  has a past medical history of Arthritis; Chronic pain; GERD (gastroesophageal reflux disease); Hypertension; Hypothyroid; Polycystic disease, ovaries; Psychiatric disorder; and Sleep apnea. [place] Sleepiness Score: 2   and Modified F.O.S.Q. Score Total / 2: 20   which reflect improved sleep quality over therapy time. O>    Visit Vitals    /73    Pulse 77    Ht 5' 2\" (1.575 m)    Wt 319 lb (144.7 kg)    SpO2 94%    BMI 58.35 kg/m2           General:   Alert, oriented, not in distress   Neck:   No JVD    Chest/Lungs:  symetrical lung expansion , no accessory muscle use    Extremities:  no obvious rashes , negative edema    Neuro:  No focal deficits ;  No obvious tremor    Psych:  Normal affect ,  Normal countenance ;         A>    ICD-10-CM ICD-9-CM 1. NANETTE (obstructive sleep apnea) G47.33 327.23 AMB SUPPLY ORDER   2. Obesity, morbid, BMI 50 or higher ([facility]) E66.01 278.01      AHI = 77(2015). On CPAP :  10-14 cmH2O. Compliant:      yes    Therapeutic Response:  Positive    P>      * Follow-up Disposition:  Return in about 1 year (around 10/1/2019). Change setting to 10-13 cm  I have ordered replacement supplies  PAP continues to benefit patient and remains necessary for the treatment of her sleep apnea    * She was asked to contact our office for any problems regarding PAP therapy. * Counseling was provided regarding the importance of regular PAP use and on proper sleep hygiene and safe driving. * Re-enforced proper and regular cleaning for the device. 2. Obesity - I have counseled the patient regarding the benefits of weight loss.       Electronically signed by    Niki Zamorano MD  Diplomate in Sleep Medicine  [facility]

## 2018-10-01 NOTE — PATIENT INSTRUCTIONS
217 [facility] [place]. [place], [address]  Tel.  [phone]  Fax. 100 [facility] 60  [place], [address] [facility] [address]  Tel.  [phone]  Fax. [phone] [address]  Tel.  [phone]  Fax. [phone]     PROPER SLEEP HYGIENE    What to avoid  · Do not have drinks with caffeine, such as coffee or black tea, for 8 hours before bed. · Do not smoke or use other types of tobacco near bedtime. Nicotine is a stimulant and can keep you awake. · Avoid drinking alcohol late in the evening, because it can cause you to wake in the middle of the night. · Do not eat a big meal close to bedtime. If you are hungry, eat a light snack. · Do not drink a lot of water close to bedtime, because the need to urinate may wake you up during the night. · Do not read or watch TV in bed. Use the bed only for sleeping and sexual activity. What to try  · Go to bed at the same time every night, and wake up at the same time every morning. Do not take naps during the day. · Keep your bedroom quiet, dark, and cool. · Get regular exercise, but not within 3 to 4 hours of your bedtime. .  · Sleep on a comfortable pillow and mattress. · If watching the clock makes you anxious, turn it facing away from you so you cannot see the time. · If you worry when you lie down, start a worry book. Well before bedtime, write down your worries, and then set the book and your concerns aside. · Try meditation or other relaxation techniques before you go to bed. · If you cannot fall asleep, get up and go to another room until you feel sleepy. Do something relaxing. Repeat your bedtime routine before you go to bed again. · Make your house quiet and calm about an hour before bedtime. Turn down the lights, turn off the TV, log off the computer, and turn down the volume on music. This can help you relax after a busy day.     Drowsy Driving  The [address] [place] [address] Road Traffic Safety Administration cites drowsiness as a causing factor in more than 770,251 police reported crashes annually, resulting in 76,000 injuries and 1,500 deaths. Other surveys suggest 55% of people polled have driven while drowsy in the past year, 23% had fallen asleep but not crashed, 3% crashed, and 2% had and accident due to drowsy driving. Who is at risk? Young Drivers: One study of drowsy driving accidents states that 55% of the drivers were under 25 years. Of those, 75% were male. Shift Workers and Travelers: People who work overnight or travel across time zones frequently are at higher risk of experiencing Circadian Rhythm Disorders. They are trying to work and function when their body is programed to sleep. Sleep Deprived: Lack of sleep has a serious impact on your ability to pay attention or focus on a task. Consistently getting less than the average of 8 hours your body needs creates partial or cumulative sleep deprivation. Untreated Sleep Disorders: Sleep Apnea, Narcolepsy, R.L.S., and other sleep disorders (untreated) prevent a person from getting enough restful sleep. This leads to excessive daytime sleepiness and increases the risk for drowsy driving accidents by up to 7 times. Medications / Alcohol: Even over the counter medications can cause drowsiness. Medications that impair a drivers attention should have a warning label. Alcohol naturally makes you sleepy and on its own can cause accidents. Combined with excessive drowsiness its effects are amplified. Signs of Drowsy Driving:   * You don't remember driving the last few miles   * You may drift out of your henry   * You are unable to focus and your thoughts wander   * You may yawn more often than normal   * You have difficulty keeping your eyes open / nodding off   * Missing traffic signs, speeding, or tailgating  Prevention-   Good sleep hygiene, lifestyle and behavioral choices have the most impact on drowsy driving.  There is no substitute for sleep and the average person requires 8 hours nightly. If you find yourself driving drowsy, stop and sleep. Consider the sleep hygiene tips provided during your visit as well. Medication Refill Policy: Refills for all medications require 1 week advance notice. Please have your pharmacy fax a refill request. We are unable to fax, or call in \"controled substance\" medications and you will need to pick these prescriptions up from our office. ZAPITANO Activation    Thank you for requesting access to ZAPITANO. Please follow the instructions below to securely access and download your online medical record. ZAPITANO allows you to send messages to your doctor, view your test results, renew your prescriptions, schedule appointments, and more. How Do I Sign Up? 1. In your internet browser, go to [email or web address]. 2. Click on the First Time User? Click Here link in the Sign In box. You will see the New Member Sign Up page. 3. Enter your ZAPIT[employer]O Access Code exactly as it appears below. You will not need to use this code after youve completed the sign-up process. If you do not sign up before the expiration date, you must request a new code. ZAPIT[employer]O Access Code: [email or web address]  Expires: 2018  2:07 PM (This is the date your ZAPITANO access code will )    4. Enter the last four digits of your Social Security Number (xxxx) and Date of Birth (mm/dd/yyyy) as indicated and click Submit. You will be taken to the next sign-up page. 5. Create a ZAPITANO ID. This will be your ZAPITANO login ID and cannot be changed, so think of one that is secure and easy to remember. 6. Create a ZAPITANO password. You can change your password at any time. 7. Enter your Password Reset Question and Answer. This can be used at a later time if you forget your password. 8. Enter your e-mail address. You will receive e-mail notification when new information is available in [address] 9. Click Sign Up.  You can now view and download portions of your medical record. 10. Click the Download Summary menu link to download a portable copy of your medical information. Additional Information    If you have questions, please call [phone]. Remember, [employer] is NOT to be used for urgent needs. For medical emergencies, dial 911.

## 2020-03-05 ENCOUNTER — HOSPITAL ENCOUNTER (OUTPATIENT)
Dept: NUCLEAR MEDICINE | Age: 60
Discharge: HOME | End: 2020-03-05
Attending: FAMILY MEDICINE
Payer: COMMERCIAL

## 2020-03-05 ENCOUNTER — HOSPITAL ENCOUNTER (OUTPATIENT)
Dept: NON INVASIVE DIAGNOSTICS | Age: 60
Discharge: HOME | End: 2020-03-05
Attending: FAMILY MEDICINE
Payer: COMMERCIAL

## 2020-03-05 VITALS
WEIGHT: 293 LBS | DIASTOLIC BLOOD PRESSURE: 97 MMHG | BODY MASS INDEX: 53.92 KG/M2 | HEIGHT: 62 IN | SYSTOLIC BLOOD PRESSURE: 195 MMHG

## 2020-03-05 DIAGNOSIS — E66.9 OBESITY: ICD-10-CM

## 2020-03-05 DIAGNOSIS — R06.02 SOBOE (SHORTNESS OF BREATH ON EXERTION): ICD-10-CM

## 2020-03-05 PROCEDURE — 78452 HT MUSCLE IMAGE SPECT MULT: CPT

## 2020-03-05 PROCEDURE — 93017 CV STRESS TEST TRACING ONLY: CPT

## 2020-03-05 PROCEDURE — 74011250636 HC RX REV CODE- 250/636: Performed by: FAMILY MEDICINE

## 2020-03-05 RX ORDER — SODIUM CHLORIDE 0.9 % (FLUSH) 0.9 %
20 SYRINGE (ML) INJECTION
Status: COMPLETED | OUTPATIENT
Start: 2020-03-05 | End: 2020-03-05

## 2020-03-05 RX ORDER — BUPROPION HYDROCHLORIDE 150 MG/1
150 TABLET ORAL
COMMUNITY

## 2020-03-05 RX ADMIN — Medication 20 ML: at 08:00

## 2020-03-05 RX ADMIN — REGADENOSON 0.4 MG: 0.08 INJECTION, SOLUTION INTRAVENOUS at 08:00

## 2020-03-06 ENCOUNTER — HOSPITAL ENCOUNTER (OUTPATIENT)
Dept: NUCLEAR MEDICINE | Age: 60
Discharge: HOME | End: 2020-03-06
Attending: FAMILY MEDICINE
Payer: COMMERCIAL

## 2020-03-06 LAB
STRESS BASELINE HR: 65 BPM
STRESS ESTIMATED WORKLOAD: 1 METS
STRESS EXERCISE DUR MIN: NORMAL
STRESS PEAK DIAS BP: 97 MMHG
STRESS PEAK SYS BP: 195 MMHG
STRESS PERCENT HR ACHIEVED: 49 %
STRESS POST PEAK HR: 78 BPM
STRESS RATE PRESSURE PRODUCT: NORMAL BPM*MMHG
STRESS TARGET HR: 159 BPM

## 2020-04-22 ENCOUNTER — HOSPITAL ENCOUNTER (OUTPATIENT)
Dept: NON INVASIVE DIAGNOSTICS | Age: 60
Discharge: HOME | End: 2020-04-22
Attending: INTERNAL MEDICINE
Payer: COMMERCIAL

## 2020-04-22 VITALS
SYSTOLIC BLOOD PRESSURE: 180 MMHG | DIASTOLIC BLOOD PRESSURE: 78 MMHG | BODY MASS INDEX: 53.92 KG/M2 | HEIGHT: 62 IN | WEIGHT: 293 LBS

## 2020-04-22 DIAGNOSIS — R93.1 ABNORMAL ECHOCARDIOGRAM: ICD-10-CM

## 2020-04-22 LAB
AV VELOCITY RATIO: 0.81
ECHO AV PEAK GRADIENT: 6 MMHG
ECHO AV PEAK VELOCITY: 122.83 CM/S
ECHO EST RA PRESSURE: 10 MMHG
ECHO LA AREA 4C: 17.8 CM2
ECHO LA VOL 4C: 54.28 ML (ref 22–52)
ECHO LA VOLUME INDEX A4C: 22.66 ML/M2 (ref 16–28)
ECHO LV E' LATERAL VELOCITY: 6.64 CM/S
ECHO LV E' SEPTAL VELOCITY: 5.46 CM/S
ECHO LV INTERNAL DIMENSION DIASTOLIC MMODE: 5.72 CM
ECHO LV INTERNAL DIMENSION SYSTOLIC MMODE: 3.59 CM
ECHO LV IVSD MMODE: 1.23 CM
ECHO LV IVSS MMODE: 1.81 CM
ECHO LV POSTERIOR WALL DIASTOLIC MMODE: 1.27 CM
ECHO LV POSTERIOR WALL SYSTOLIC MMODE: 1.71 CM
ECHO LVOT PEAK GRADIENT: 4 MMHG
ECHO LVOT PEAK VELOCITY: 99.75 CM/S
ECHO MV A VELOCITY: 94.08 CM/S
ECHO MV E DECELERATION TIME (DT): 288.6 MS
ECHO MV E VELOCITY: 85.85 CM/S
ECHO MV E/A RATIO: 0.91
ECHO MV E/E' LATERAL: 12.93
ECHO MV E/E' RATIO (AVERAGED): 14.33
ECHO MV E/E' SEPTAL: 15.72
ECHO MV REGURGITANT PEAK GRADIENT: 82.6 MMHG
ECHO MV REGURGITANT PEAK VELOCITY: 454.31 CM/S
ECHO PULMONARY ARTERY SYSTOLIC PRESSURE (PASP): 35.6 MMHG
ECHO RIGHT VENTRICULAR SYSTOLIC PRESSURE (RVSP): 35.6 MMHG
ECHO RV INTERNAL DIMENSION: 2.32 CM
ECHO TV A WAVE: 61.83 CM/S
ECHO TV E WAVE: 71.68 CM/S
ECHO TV EROA: 0.9
ECHO TV REGURGITANT MAX VELOCITY: 252.78 CM/S
ECHO TV REGURGITANT PEAK GRADIENT: 25.6 MMHG
LVFS: 37.13 %
MV DEC SLOPE: 2.97

## 2020-04-22 PROCEDURE — 93306 TTE W/DOPPLER COMPLETE: CPT

## 2020-07-23 ENCOUNTER — TELEPHONE (OUTPATIENT)
Dept: SLEEP MEDICINE | Age: 60
End: 2020-07-23

## 2020-07-23 DIAGNOSIS — G47.33 OBSTRUCTIVE SLEEP APNEA (ADULT) (PEDIATRIC): Primary | ICD-10-CM

## 2020-07-23 NOTE — TELEPHONE ENCOUNTER
Saw  on virtual visit today and he said she wanted supplies sent ot [facility] fo rhis wife, Monroe Ramírez attached

## 2020-08-12 ENCOUNTER — TELEPHONE (OUTPATIENT)
Dept: SLEEP MEDICINE | Age: 60
End: 2020-08-12

## 2020-08-12 NOTE — TELEPHONE ENCOUNTER
Stewart Mcgill from [address] [place] [address]. called into the office stating that they received the pap order for patient, however at this time patient does not have insurance and does not  want to proceed with pap.

## 2020-08-19 ENCOUNTER — APPOINTMENT (OUTPATIENT)
Dept: PHYSICAL THERAPY | Age: 60
End: 2020-08-19

## 2020-10-29 ENCOUNTER — TELEPHONE (OUTPATIENT)
Dept: SLEEP MEDICINE | Age: 60
End: 2020-10-29

## 2020-10-29 DIAGNOSIS — G47.33 OBSTRUCTIVE SLEEP APNEA (ADULT) (PEDIATRIC): Primary | ICD-10-CM

## 2020-10-29 NOTE — TELEPHONE ENCOUNTER
Patient phoned the office stating that yesterday her machine read \"motor life has exceeded\" Patient is requesting new machine. Please write order for new pap machine.

## 2020-11-06 ENCOUNTER — DOCUMENTATION ONLY (OUTPATIENT)
Dept: SLEEP MEDICINE | Age: 60
End: 2020-11-06

## 2022-04-22 NOTE — PROGRESS NOTES
PAP supply order was faxed to [facility] on 1/17/17
Sander, son called stating he is returning Dr Alfredo's call. Please call back.   
n/a